# Patient Record
Sex: FEMALE | URBAN - METROPOLITAN AREA
[De-identification: names, ages, dates, MRNs, and addresses within clinical notes are randomized per-mention and may not be internally consistent; named-entity substitution may affect disease eponyms.]

---

## 2018-10-19 ENCOUNTER — OFFICE VISIT (OUTPATIENT)
Dept: URBAN - METROPOLITAN AREA CLINIC 11 | Facility: CLINIC | Age: 68
End: 2018-10-19
Payer: MEDICARE

## 2018-10-19 PROCEDURE — 92133 CPTRZD OPH DX IMG PST SGM ON: CPT | Performed by: OPTOMETRIST

## 2018-10-19 PROCEDURE — 76514 ECHO EXAM OF EYE THICKNESS: CPT | Performed by: OPTOMETRIST

## 2018-10-19 PROCEDURE — 99204 OFFICE O/P NEW MOD 45 MIN: CPT | Performed by: OPTOMETRIST

## 2018-10-19 RX ORDER — BIMATOPROST 0.1 MG/ML
0.01 % SOLUTION/ DROPS OPHTHALMIC
Qty: 1 | Refills: 9 | Status: INACTIVE
Start: 2018-10-19 | End: 2019-04-29

## 2018-10-19 ASSESSMENT — INTRAOCULAR PRESSURE
OS: 16
OD: 12

## 2018-10-19 NOTE — IMPRESSION/PLAN
Impression: Primary open-angle glaucoma, left eye, severe stage: C84.2679. OCT 10/18 7/7 102/52; /515 Plan: OCT of RNFL and pachs ordered and performed today ou. RNFL thinning OS. Sample/Rx Lumigan 1gt qhs ou. Pt ed on importance of drops and and regular follow ups.  shedule VF.
f/u 2mn VF IOP

## 2019-03-01 ENCOUNTER — TESTING ONLY (OUTPATIENT)
Dept: URBAN - METROPOLITAN AREA CLINIC 11 | Facility: CLINIC | Age: 69
End: 2019-03-01
Payer: COMMERCIAL

## 2019-03-01 PROCEDURE — 92083 EXTENDED VISUAL FIELD XM: CPT | Performed by: OPTOMETRIST

## 2019-03-01 ASSESSMENT — VISUAL ACUITY
OD: 20/30
OS: 20/30

## 2019-04-08 ENCOUNTER — OFFICE VISIT (OUTPATIENT)
Dept: URBAN - METROPOLITAN AREA CLINIC 11 | Facility: CLINIC | Age: 69
End: 2019-04-08
Payer: COMMERCIAL

## 2019-04-08 PROCEDURE — 99213 OFFICE O/P EST LOW 20 MIN: CPT | Performed by: OPTOMETRIST

## 2019-04-08 ASSESSMENT — INTRAOCULAR PRESSURE
OD: 9
OS: 11

## 2019-04-08 NOTE — IMPRESSION/PLAN
Impression: Primary open-angle glaucoma, left eye, severe stage: Q41.3349. OCT 10/18 7/7 102/52; /515, VF 3/19 OD central? OS SA IA Plan: IOP at a good level today ou. VF reviewed today. VF defects ou - unreliable OD. Cont Lumigan 1gt qhs ou. Pt ed on importance of drops and and regular follow ups.  
f/u 3mn IOP photos

## 2019-07-10 ENCOUNTER — OFFICE VISIT (OUTPATIENT)
Dept: URBAN - METROPOLITAN AREA CLINIC 11 | Facility: CLINIC | Age: 69
End: 2019-07-10
Payer: MEDICARE

## 2019-07-10 PROCEDURE — 92012 INTRM OPH EXAM EST PATIENT: CPT | Performed by: OPTOMETRIST

## 2019-07-10 PROCEDURE — 92250 FUNDUS PHOTOGRAPHY W/I&R: CPT | Performed by: OPTOMETRIST

## 2019-07-10 RX ORDER — BIMATOPROST 0.1 MG/ML
0.01 % SOLUTION/ DROPS OPHTHALMIC
Qty: 1 | Refills: 9 | Status: INACTIVE
Start: 2019-07-10 | End: 2020-05-26

## 2019-07-10 ASSESSMENT — INTRAOCULAR PRESSURE
OS: 11
OD: 9

## 2019-07-10 NOTE — IMPRESSION/PLAN
Impression: Primary open-angle glaucoma, left eye, severe stage: X79.6181. OCT 10/18 7/7 102/52; /515, VF 3/19 OD central? OS SA IA; photos 7/19 Plan: photos ordered and performed today ou. IOP at a good level today ou. Cont. Tsering 1gt qhs ou. Pt ed on importance of drops and and regular follow ups.  
f/u 3mn IOP DE OCT

## 2019-09-10 ENCOUNTER — OFFICE VISIT (OUTPATIENT)
Dept: URBAN - METROPOLITAN AREA CLINIC 11 | Facility: CLINIC | Age: 69
End: 2019-09-10
Payer: MEDICARE

## 2019-09-10 DIAGNOSIS — H43.811 VITREOUS DEGENERATION, RIGHT EYE: Primary | ICD-10-CM

## 2019-09-10 PROCEDURE — 92014 COMPRE OPH EXAM EST PT 1/>: CPT | Performed by: OPTOMETRIST

## 2019-09-10 ASSESSMENT — INTRAOCULAR PRESSURE
OS: 12
OD: 10

## 2019-09-10 NOTE — IMPRESSION/PLAN
Impression: Primary open-angle glaucoma, left eye, severe stage: T10.2633. OCT 10/18 7/7 102/52; /515, VF 3/19 OD central? OS SA IA; photos 7/19 Plan: IOP at a good level today ou. Cont. Tsering 1gt qhs ou. Pt ed on importance of drops and and regular follow ups.  
f/u 1mn IOP DE OCT

## 2019-09-10 NOTE — IMPRESSION/PLAN
Impression: Vitreous degeneration, right eye: H43.811. Plan: Will continue to observe condition and or symptoms. Discussed signs and symptoms of retinal detachment. Discussed signs and symptoms of PVD/floaters. Patient instructed to call if condition gets worse.

## 2019-10-15 ENCOUNTER — OFFICE VISIT (OUTPATIENT)
Dept: URBAN - METROPOLITAN AREA CLINIC 11 | Facility: CLINIC | Age: 69
End: 2019-10-15
Payer: MEDICARE

## 2019-10-15 DIAGNOSIS — H40.1123 PRIMARY OPEN-ANGLE GLAUCOMA, LEFT EYE, SEVERE STAGE: Primary | ICD-10-CM

## 2019-10-15 PROCEDURE — 92014 COMPRE OPH EXAM EST PT 1/>: CPT | Performed by: OPTOMETRIST

## 2019-10-15 PROCEDURE — 92133 CPTRZD OPH DX IMG PST SGM ON: CPT | Performed by: OPTOMETRIST

## 2019-10-15 ASSESSMENT — INTRAOCULAR PRESSURE
OS: 14
OD: 12

## 2019-10-15 NOTE — IMPRESSION/PLAN
Impression: Primary open-angle glaucoma, left eye, severe stage: Q30.5270. OCT 10/19 7/7 105/52(102/52); /515, VF 3/19 OD central? OS SA IA; photos 7/19 Plan: OCT of RNFL ordered and performed today ou. RNFL stable ou. IOP at a good level today ou. Cont. Tsering 1gt qhs ou. Pt ed on importance of drops and and regular follow ups.  
f/u 3-4mns IOP

## 2020-03-02 ENCOUNTER — OFFICE VISIT (OUTPATIENT)
Dept: URBAN - METROPOLITAN AREA CLINIC 11 | Facility: CLINIC | Age: 70
End: 2020-03-02
Payer: MEDICARE

## 2020-03-02 PROCEDURE — 99213 OFFICE O/P EST LOW 20 MIN: CPT | Performed by: OPTOMETRIST

## 2020-03-02 ASSESSMENT — INTRAOCULAR PRESSURE
OD: 11
OS: 11

## 2020-03-02 NOTE — IMPRESSION/PLAN
Impression: Primary open-angle glaucoma, left eye, severe stage: U24.7870. OCT 10/19 7/7 105/52(102/52); /515, VF 3/19 OD central? OS SA IA; photos 7/19 Plan: IOP at a good level today ou. Cont. Tsering 1gt qhs ou. Pt ed on importance of drops and and regular follow ups.  
f/u 3-4mns IOP, VF

## 2020-06-11 ENCOUNTER — OFFICE VISIT (OUTPATIENT)
Dept: URBAN - METROPOLITAN AREA CLINIC 11 | Facility: CLINIC | Age: 70
End: 2020-06-11
Payer: MEDICARE

## 2020-06-11 PROCEDURE — 92012 INTRM OPH EXAM EST PATIENT: CPT | Performed by: OPTOMETRIST

## 2020-06-11 PROCEDURE — 92083 EXTENDED VISUAL FIELD XM: CPT | Performed by: OPTOMETRIST

## 2020-06-11 ASSESSMENT — INTRAOCULAR PRESSURE
OS: 12
OD: 11

## 2020-06-11 NOTE — IMPRESSION/PLAN
Impression: Primary open-angle glaucoma, left eye, severe stage: Y66.0246. OCT 10/19 7/7 105/52(102/52); /515, VF 6/20 OD central? OS NS ?; photos 7/19 Plan: VF ordered and performed today ou. VF defects ou. VF unreliable ou. IOP at a good level today ou. Cont. Tsering 1gt qhs ou. Pt ed on importance of drops and and regular follow ups.  
f/u 3-4mns IOP, DE OCT

## 2020-07-30 ENCOUNTER — OFFICE VISIT (OUTPATIENT)
Dept: URBAN - METROPOLITAN AREA CLINIC 11 | Facility: CLINIC | Age: 70
End: 2020-07-30
Payer: MEDICARE

## 2020-07-30 DIAGNOSIS — H04.123 DRY EYE SYNDROME OF BILATERAL LACRIMAL GLANDS: Primary | ICD-10-CM

## 2020-07-30 PROCEDURE — 92012 INTRM OPH EXAM EST PATIENT: CPT | Performed by: OPTOMETRIST

## 2020-07-30 ASSESSMENT — INTRAOCULAR PRESSURE
OS: 14
OD: 11

## 2020-08-18 ENCOUNTER — OFFICE VISIT (OUTPATIENT)
Dept: URBAN - METROPOLITAN AREA CLINIC 11 | Facility: CLINIC | Age: 70
End: 2020-08-18
Payer: MEDICARE

## 2020-08-18 PROCEDURE — 92012 INTRM OPH EXAM EST PATIENT: CPT | Performed by: OPTOMETRIST

## 2020-08-18 ASSESSMENT — VISUAL ACUITY
OS: 20/50
OD: 20/40

## 2020-08-18 ASSESSMENT — INTRAOCULAR PRESSURE
OD: 11
OS: 14

## 2020-08-31 ENCOUNTER — OFFICE VISIT (OUTPATIENT)
Dept: URBAN - METROPOLITAN AREA CLINIC 11 | Facility: CLINIC | Age: 70
End: 2020-08-31
Payer: MEDICARE

## 2020-08-31 PROCEDURE — 99213 OFFICE O/P EST LOW 20 MIN: CPT | Performed by: OPTOMETRIST

## 2020-09-08 ENCOUNTER — TESTING ONLY (OUTPATIENT)
Dept: URBAN - METROPOLITAN AREA CLINIC 11 | Facility: CLINIC | Age: 70
End: 2020-09-08

## 2020-09-08 DIAGNOSIS — H52.4 PRESBYOPIA: Primary | ICD-10-CM

## 2020-09-08 PROCEDURE — V2799 MISC VISION ITEM OR SERVICE: HCPCS | Performed by: OPTOMETRIST

## 2020-09-08 NOTE — IMPRESSION/PLAN
Impression: Presbyopia: H52.4. Plan: Recommend using PAL for 2 weeks to get accustomed to new lenses.